# Patient Record
Sex: FEMALE | Race: WHITE | Employment: OTHER | URBAN - METROPOLITAN AREA
[De-identification: names, ages, dates, MRNs, and addresses within clinical notes are randomized per-mention and may not be internally consistent; named-entity substitution may affect disease eponyms.]

---

## 2024-10-30 ENCOUNTER — APPOINTMENT (OUTPATIENT)
Dept: CT IMAGING | Age: 69
End: 2024-10-30
Payer: MEDICARE

## 2024-10-30 ENCOUNTER — HOSPITAL ENCOUNTER (EMERGENCY)
Age: 69
Discharge: HOME | End: 2024-10-30
Payer: MEDICARE

## 2024-10-30 VITALS
SYSTOLIC BLOOD PRESSURE: 135 MMHG | TEMPERATURE: 98.6 F | DIASTOLIC BLOOD PRESSURE: 86 MMHG | RESPIRATION RATE: 16 BRPM | OXYGEN SATURATION: 94 % | HEART RATE: 71 BPM

## 2024-10-30 DIAGNOSIS — K20.90 ESOPHAGITIS: Primary | ICD-10-CM

## 2024-10-30 LAB
ANION GAP SERPL CALCULATED.3IONS-SCNC: 12 MMOL/L (ref 7–16)
BASOPHILS # BLD: 0.07 K/UL (ref 0–0.2)
BASOPHILS NFR BLD: 1 % (ref 0–2)
BUN SERPL-MCNC: 9 MG/DL (ref 6–23)
CALCIUM SERPL-MCNC: 9.3 MG/DL (ref 8.6–10.2)
CHLORIDE SERPL-SCNC: 103 MMOL/L (ref 98–107)
CO2 SERPL-SCNC: 25 MMOL/L (ref 22–29)
CREAT SERPL-MCNC: 0.6 MG/DL (ref 0.5–1)
EOSINOPHIL # BLD: 0.11 K/UL (ref 0.05–0.5)
EOSINOPHILS RELATIVE PERCENT: 2 % (ref 0–6)
ERYTHROCYTE [DISTWIDTH] IN BLOOD BY AUTOMATED COUNT: 12.2 % (ref 11.5–15)
GFR, ESTIMATED: >90 ML/MIN/1.73M2
GLUCOSE SERPL-MCNC: 92 MG/DL (ref 74–99)
HCT VFR BLD AUTO: 41.1 % (ref 34–48)
HGB BLD-MCNC: 13.9 G/DL (ref 11.5–15.5)
IMM GRANULOCYTES # BLD AUTO: <0.03 K/UL (ref 0–0.58)
IMM GRANULOCYTES NFR BLD: 0 % (ref 0–5)
LYMPHOCYTES NFR BLD: 1.79 K/UL (ref 1.5–4)
LYMPHOCYTES RELATIVE PERCENT: 27 % (ref 20–42)
MCH RBC QN AUTO: 30.3 PG (ref 26–35)
MCHC RBC AUTO-ENTMCNC: 33.8 G/DL (ref 32–34.5)
MCV RBC AUTO: 89.7 FL (ref 80–99.9)
MONOCYTES NFR BLD: 0.53 K/UL (ref 0.1–0.95)
MONOCYTES NFR BLD: 8 % (ref 2–12)
NEUTROPHILS NFR BLD: 62 % (ref 43–80)
NEUTS SEG NFR BLD: 4.07 K/UL (ref 1.8–7.3)
PLATELET # BLD AUTO: 388 K/UL (ref 130–450)
PMV BLD AUTO: 9.2 FL (ref 7–12)
POTASSIUM SERPL-SCNC: 3.7 MMOL/L (ref 3.5–5)
RBC # BLD AUTO: 4.58 M/UL (ref 3.5–5.5)
SODIUM SERPL-SCNC: 140 MMOL/L (ref 132–146)
WBC OTHER # BLD: 6.6 K/UL (ref 4.5–11.5)

## 2024-10-30 PROCEDURE — 85025 COMPLETE CBC W/AUTO DIFF WBC: CPT

## 2024-10-30 PROCEDURE — 99285 EMERGENCY DEPT VISIT HI MDM: CPT

## 2024-10-30 PROCEDURE — 70491 CT SOFT TISSUE NECK W/DYE: CPT

## 2024-10-30 PROCEDURE — 6370000000 HC RX 637 (ALT 250 FOR IP): Performed by: NURSE PRACTITIONER

## 2024-10-30 PROCEDURE — 6360000004 HC RX CONTRAST MEDICATION: Performed by: RADIOLOGY

## 2024-10-30 PROCEDURE — 80048 BASIC METABOLIC PNL TOTAL CA: CPT

## 2024-10-30 RX ORDER — IOPAMIDOL 755 MG/ML
75 INJECTION, SOLUTION INTRAVASCULAR
Status: COMPLETED | OUTPATIENT
Start: 2024-10-30 | End: 2024-10-30

## 2024-10-30 RX ORDER — PANTOPRAZOLE SODIUM 20 MG/1
20 TABLET, DELAYED RELEASE ORAL DAILY
Qty: 30 TABLET | Refills: 0 | Status: SHIPPED | OUTPATIENT
Start: 2024-10-30

## 2024-10-30 RX ADMIN — IOPAMIDOL 75 ML: 755 INJECTION, SOLUTION INTRAVENOUS at 16:36

## 2024-10-30 RX ADMIN — ALUMINUM HYDROXIDE, MAGNESIUM HYDROXIDE, AND SIMETHICONE: 1200; 120; 1200 SUSPENSION ORAL at 15:37

## 2024-10-30 ASSESSMENT — LIFESTYLE VARIABLES
HOW MANY STANDARD DRINKS CONTAINING ALCOHOL DO YOU HAVE ON A TYPICAL DAY: PATIENT DOES NOT DRINK
HOW OFTEN DO YOU HAVE A DRINK CONTAINING ALCOHOL: NEVER

## 2024-10-30 ASSESSMENT — PAIN DESCRIPTION - PAIN TYPE: TYPE: ACUTE PAIN

## 2024-10-30 ASSESSMENT — PAIN DESCRIPTION - LOCATION: LOCATION: THROAT

## 2024-10-30 ASSESSMENT — PAIN DESCRIPTION - FREQUENCY: FREQUENCY: CONTINUOUS

## 2024-10-30 ASSESSMENT — PAIN - FUNCTIONAL ASSESSMENT: PAIN_FUNCTIONAL_ASSESSMENT: 0-10

## 2024-10-30 ASSESSMENT — PAIN SCALES - GENERAL: PAINLEVEL_OUTOF10: 5

## 2024-10-30 NOTE — DISCHARGE INSTRUCTIONS
CT SOFT TISSUE NECK W CONTRAST   Final Result   1. Findings of mild reflux esophagitis with mild diffuse thickening of the   wall of the superior thoracic esophagus.   2. Moderate symmetric bilateral swelling and soft tissue stranding throughout   the maxillary and mandibular subcutaneous fat. This is most likely cellulitis.   3. No mass or abscess is seen.   4. No cervical lymphadenopathy.            The facial cellulitis that the radiologist reading is a likely due to your facial fillers.  I do not think there is any infection here.  You have some esophagitis this is likely was causing this burning sensation.  You will start Protonix once daily.  Avoid caffeine and acidic foods, avoid chocolate.  Sit up at least 30 minutes after meals.  Please follow-up with your regular doctor when you return home.

## 2024-10-30 NOTE — ED PROVIDER NOTES
Independent [facility] Visit.        [facility]  Department of Emergency Medicine   ED  Encounter Note  Admit Date/RoomTime: 10/30/2024  3:00 PM  ED Room:     NAME: Angie Hatch  : 1955  MRN: [id number]     Chief Complaint:  OTHER (2 days after getting  covid vac a week ago, started to experiencing throat irritation )    History of Present Illness       Angie Hatch is a 68 y.o. old female who presents to the emergency department with complaints of a 5-day history of throat irritation.  Patient states that she got a COVID-19 vaccine about a week ago, about 2 days after getting the vaccine she developed some irritation in her esophagus.  States that anytime she drinks any water, juice or wine she feels a burning sensation in her throat.  She is also describing feeling like only half of her throat is swallowing.  She is unable to expand on this sensation.  States that sometimes she feels like she is having some mild reflux like her stomach acid was going into her throat.  She has not had any nausea or vomiting, no trismus or drooling, no fevers, has been able to tolerate food and fluids just causes a burning sensation.    ROS   Pertinent positives and negatives are stated within HPI, all other systems reviewed and are negative.    Past Medical History:  has no past medical history on file.    Surgical History:  has no past surgical history on file.    Social History:  reports that she has never smoked. She has never used smokeless tobacco. She reports that she does not drink alcohol.    Family History: family history is not on file.     Allergies: Patient has no known allergies.    Physical Exam   Oxygen Saturation Interpretation: Normal on room air analysis.        ED Triage Vitals [10/30/24 1443]   BP Systolic BP Percentile Diastolic BP Percentile Temp Temp Source Pulse Respirations SpO2   (!) 146/81 -- -- 98.6 °F (37 °C) Oral 71 16 94 %      Height Weight         -- --            There was note of moderate symmetric bilateral soft tissue swelling and soft tissue stranding throughout the maxillary mandibular subcutaneous fat which radiology suggest a cellulitis.  Patient does in fact have facial skin fillers, she received her last fillers about 4 months ago.  This is likely the cause of this irregularity on her CT scanning.  Patient was medicated with a GI cocktail which completely relieved her symptoms.  Patient was placed on Protonix daily 30 minutes before meals.  Advised to avoid alcohol, chocolate, caffeine.  Advised to set up for at least 30 minutes after each meal.  Advised to sleep in a propped position.  Advised follow-up with her PCP when she returns home as she is currently traveling here from [place].  Discussed strict return to ER precautions.    Patient was explicitly instructed on specific signs and symptoms on which to return to the emergency room for. Patient was instructed to return to the ER for any new or worsening symptoms. Additional discharge instructions were given verbally. All questions were answered. Patient is comfortable and agreeable with discharge plan. Patient in no acute distress and non-toxic in appearance.        Assessment     1. Esophagitis      Plan   Discharged home.  Patient condition is stable    [facility] primary care  [phone]  Call in 2 days         New Medications     Discharge Medication List as of 10/30/2024  5:02 PM        START taking these medications    Details   pantoprazole (PROTONIX) 20 MG tablet Take 1 tablet by mouth daily, Disp-30 tablet, R-0Normal           Electronically signed by TOMASA Rangel CNP   DD: 10/30/24  **This report was transcribed using voice recognition software. Every effort was made to ensure accuracy; however, inadvertent computerized transcription errors may be present.  END OF ED PROVIDER NOTE